# Patient Record
Sex: MALE | Race: WHITE | NOT HISPANIC OR LATINO | Employment: OTHER | URBAN - METROPOLITAN AREA
[De-identification: names, ages, dates, MRNs, and addresses within clinical notes are randomized per-mention and may not be internally consistent; named-entity substitution may affect disease eponyms.]

---

## 2017-04-19 RX ORDER — FLECAINIDE ACETATE 50 MG/1
50 TABLET ORAL 2 TIMES DAILY
Qty: 60 TABLET | Refills: 0 | Status: SHIPPED | OUTPATIENT
Start: 2017-04-19 | End: 2017-04-19 | Stop reason: SDUPTHER

## 2017-04-19 RX ORDER — FLECAINIDE ACETATE 50 MG/1
50 TABLET ORAL 2 TIMES DAILY
Qty: 20 TABLET | Refills: 3 | Status: SHIPPED | OUTPATIENT
Start: 2017-04-19 | End: 2018-12-08

## 2018-02-21 ENCOUNTER — OFFICE VISIT (OUTPATIENT)
Dept: RETAIL CLINIC | Facility: CLINIC | Age: 33
End: 2018-02-21

## 2018-02-21 VITALS
TEMPERATURE: 97.9 F | SYSTOLIC BLOOD PRESSURE: 110 MMHG | HEART RATE: 97 BPM | RESPIRATION RATE: 13 BRPM | OXYGEN SATURATION: 97 % | DIASTOLIC BLOOD PRESSURE: 80 MMHG

## 2018-02-21 DIAGNOSIS — J40 BRONCHITIS: ICD-10-CM

## 2018-02-21 DIAGNOSIS — I20.0 UNSTABLE ANGINA PECTORIS (HCC): Primary | ICD-10-CM

## 2018-02-21 DIAGNOSIS — R68.89 FLU-LIKE SYMPTOMS: ICD-10-CM

## 2018-02-21 PROBLEM — R05.9 COUGH: Status: ACTIVE | Noted: 2018-02-21

## 2018-02-21 PROBLEM — R09.89 CHEST CONGESTION: Status: ACTIVE | Noted: 2018-02-21

## 2018-02-21 LAB
EXPIRATION DATE: NORMAL
FLUAV AG NPH QL: NORMAL
FLUBV AG NPH QL: NORMAL
INTERNAL CONTROL: NORMAL
Lab: NORMAL

## 2018-02-21 PROCEDURE — 87804 INFLUENZA ASSAY W/OPTIC: CPT | Performed by: NURSE PRACTITIONER

## 2018-02-21 PROCEDURE — 99203 OFFICE O/P NEW LOW 30 MIN: CPT | Performed by: NURSE PRACTITIONER

## 2018-02-21 RX ORDER — PREDNISONE 1 MG/1
TABLET ORAL
Qty: 21 TABLET | Refills: 0 | Status: SHIPPED | OUTPATIENT
Start: 2018-02-21 | End: 2018-03-30

## 2018-02-21 RX ORDER — BROMPHENIRAMINE MALEATE, PSEUDOEPHEDRINE HYDROCHLORIDE, AND DEXTROMETHORPHAN HYDROBROMIDE 2; 30; 10 MG/5ML; MG/5ML; MG/5ML
5 SYRUP ORAL 4 TIMES DAILY PRN
Qty: 150 ML | Refills: 0 | Status: SHIPPED | OUTPATIENT
Start: 2018-02-21 | End: 2018-03-30

## 2018-02-21 RX ORDER — ALBUTEROL SULFATE 90 UG/1
2 AEROSOL, METERED RESPIRATORY (INHALATION) EVERY 4 HOURS PRN
Qty: 1 INHALER | Refills: 0 | Status: SHIPPED | OUTPATIENT
Start: 2018-02-21 | End: 2018-03-30

## 2018-02-21 NOTE — PROGRESS NOTES
Sujata Moon is a 32 y.o. male.     HPI Comments: Flu like symptoms , cough, chest congestion and occasional chest pain  during exertion from few days     Cough   This is a new problem. The current episode started yesterday. The cough is non-productive. Associated symptoms include chest pain, headaches, postnasal drip, rhinorrhea and wheezing. Pertinent negatives include no fever. Risk factors for lung disease include smoking/tobacco exposure. He has tried OTC cough suppressant for the symptoms. His past medical history is significant for bronchitis and environmental allergies.   Headache    Associated symptoms include coughing and rhinorrhea. Pertinent negatives include no fever.        The following portions of the patient's history were reviewed and updated as appropriate: allergies, current medications, past family history, past medical history, past social history, past surgical history and problem list.    Review of Systems   Constitutional: Negative for fatigue and fever.   HENT: Positive for congestion, postnasal drip and rhinorrhea.    Eyes: Negative.    Respiratory: Positive for cough, chest tightness and wheezing.    Cardiovascular: Positive for chest pain and leg swelling. Negative for palpitations.        Chest pain during exertion, chest wall pain during exertion  And history of afib   Gastrointestinal: Negative.    Allergic/Immunologic: Positive for environmental allergies.   Neurological: Positive for headaches.       Objective   Physical Exam   Constitutional: He appears well-developed and well-nourished.   HENT:   Head: Normocephalic and atraumatic.   Right Ear: External ear normal.   Left Ear: External ear normal.   Nose: Mucosal edema present. No rhinorrhea. Right sinus exhibits no maxillary sinus tenderness and no frontal sinus tenderness. Left sinus exhibits no maxillary sinus tenderness and no frontal sinus tenderness.   Mouth/Throat: No oropharyngeal exudate, posterior  oropharyngeal edema or posterior oropharyngeal erythema.   Eyes: Pupils are equal, round, and reactive to light.   Neck: Normal range of motion.   Cardiovascular: Normal rate, regular rhythm and normal heart sounds.    Pulmonary/Chest: Effort normal. No respiratory distress. He has wheezes. He has rhonchi in the right middle field and the left middle field. He has no rales. He exhibits tenderness.   Abdominal: Soft.   Musculoskeletal:        Arms:  Chest tenderness during exertion/ breathing   Nursing note and vitals reviewed.      Assessment/Plan   Jayson was seen today for cough, flu symptoms, headache and chest pain.    Diagnoses and all orders for this visit:    Unstable angina pectoris    Flu-like symptoms  -     POCT Influenza A/B    Bronchitis  -     predniSONE (DELTASONE) 5 MG tablet; 5mg pack with package instructions  -     brompheniramine-pseudoephedrine-DM 30-2-10 MG/5ML syrup; Take 5 mL by mouth 4 (Four) Times a Day As Needed for Congestion or Cough.  -     albuterol (PROVENTIL HFA;VENTOLIN HFA) 108 (90 Base) MCG/ACT inhaler; Inhale 2 puffs Every 4 (Four) Hours As Needed for Wheezing or Shortness of Air.    Talked to the patient about the diagnosis and the negative flu test and refer him to urgent care or ER or primary care for further evaluation for the chest pain during exertion  Talked with his primary care office 10 minutes to get an appointment there to rule out chest pain by chest xray and EKG. Finally primary care told him that they will keep an appointment after 2  months

## 2018-02-21 NOTE — PATIENT INSTRUCTIONS
Influenza Tests  Why am I having this test?  You may have an influenza test to help your health care provider determine what type of respiratory infection you have. The test may also be used to help determine a treatment plan and to monitor influenza activity within a community.  There are two types of influenza virus: types A and B. Often, one strain of type A influenza will be the most common type of influenza in a community during flu season. This is typically between the months of October and May. Influenza tests can help determine which strain of influenza type A is occurring most often in the community.  What kind of sample is taken?  Influenza tests are performed by collecting a small sample of fluids (secretions) from your nose or throat using a cotton swab. Tests performed on nasal secretions are more accurate than tests performed on a sample taken from your throat.  · Rapid influenza tests are available and have become the most frequently used tests for influenza. They are most accurate when completed within the first 48 hours after your symptoms begin.  ¨ Depending on the method, a rapid influenza test may be completed in your health care provider's office in less than 30 minutes. It can also be sent to a lab with the results available the same day.  ¨ Depending on the particular type of test used, it can identify influenza type A, a mixture of types A and B, or differentiate between type A and B.  · Another test that your health care provider may order is a viral culture. This also requires the collection of secretions from your nose or throat. The sample is then sent to a lab for processing. This may take several days to complete.  How are the results reported?  Your test results will be reported as either positive or negative. A false-negative result can occur. A false-negative result is incorrect because it indicates a condition or finding is not present when it is.  It is your responsibility to  obtain your test results. Ask the lab or department performing the test when and how you will get your results.  What do the results mean?  · A positive test means you have influenza. Tests may further determine the type of influenza you have.  · A negative influenza test result means it is not likely that you have influenza.  · A false-negative result can occur. False-negative results are more likely to happen at the height of the influenza season.  Talk with your health care provider to discuss your results, treatment options, and if necessary, the need for more tests. Talk with your health care provider if you have any questions about your results.  Talk with your health care provider to discuss your results, treatment options, and if necessary, the need for more tests. Talk with your health care provider if you have any questions about your results.  This information is not intended to replace advice given to you by your health care provider. Make sure you discuss any questions you have with your health care provider.  Document Released: 09/27/2006 Document Revised: 08/23/2017 Document Reviewed: 05/06/2015  [employer] Interactive Patient Education © 2017 [employer]    Acute Bronchitis, Adult  Acute bronchitis is sudden (acute) swelling of the air tubes (bronchi) in the lungs. Acute bronchitis causes these tubes to fill with mucus, which can make it hard to breathe. It can also cause coughing or wheezing.  In adults, acute bronchitis usually goes away within 2 weeks. A cough caused by bronchitis may last up to 3 weeks. Smoking, allergies, and asthma can make the condition worse. Repeated episodes of bronchitis may cause further lung problems, such as chronic obstructive pulmonary disease (COPD).  What are the causes?  This condition can be caused by germs and by substances that irritate the lungs, including:  · Cold and flu viruses. This condition is most often caused by the same virus that causes a  cold.  · Bacteria.  · Exposure to tobacco smoke, dust, fumes, and air pollution.  What increases the risk?  This condition is more likely to develop in people who:  · Have close contact with someone with acute bronchitis.  · Are exposed to lung irritants, such as tobacco smoke, dust, fumes, and vapors.  · Have a weak immune system.  · Have a respiratory condition such as asthma.  What are the signs or symptoms?  Symptoms of this condition include:  · A cough.  · Coughing up clear, yellow, or green mucus.  · Wheezing.  · Chest congestion.  · Shortness of breath.  · A fever.  · Body aches.  · Chills.  · A sore throat.  How is this diagnosed?  This condition is usually diagnosed with a physical exam. During the exam, your health care provider may order tests, such as chest X-rays, to rule out other conditions. He or she may also:  · Test a sample of your mucus for bacterial infection.  · Check the level of oxygen in your blood. This is done to check for pneumonia.  · Do a chest X-ray or lung function testing to rule out pneumonia and other conditions.  · Perform blood tests.  Your health care provider will also ask about your symptoms and medical history.  How is this treated?  Most cases of acute bronchitis clear up over time without treatment. Your health care provider may recommend:  · Drinking more fluids. Drinking more makes your mucus thinner, which may make it easier to breathe.  · Taking a medicine for a fever or cough.  · Taking an antibiotic medicine.  · Using an inhaler to help improve shortness of breath and to control a cough.  · Using a cool mist vaporizer or humidifier to make it easier to breathe.  Follow these instructions at home:  Medicines   · Take over-the-counter and prescription medicines only as told by your health care provider.  · If you were prescribed an antibiotic, take it as told by your health care provider. Do not stop taking the antibiotic even if you start to feel better.  General  instructions   · Get plenty of rest.  · Drink enough fluids to keep your urine clear or pale yellow.  · Avoid smoking and secondhand smoke. Exposure to cigarette smoke or irritating chemicals will make bronchitis worse. If you smoke and you need help quitting, ask your health care provider. Quitting smoking will help your lungs heal faster.  · Use an inhaler, cool mist vaporizer, or humidifier as told by your health care provider.  · Keep all follow-up visits as told by your health care provider. This is important.  How is this prevented?  To lower your risk of getting this condition again:  · Wash your hands often with soap and water. If soap and water are not available, use hand .  · Avoid contact with people who have cold symptoms.  · Try not to touch your hands to your mouth, nose, or eyes.  · Make sure to get the flu shot every year.  Contact a health care provider if:  · Your symptoms do not improve in 2 weeks of treatment.  Get help right away if:  · You cough up blood.  · You have chest pain.  · You have severe shortness of breath.  · You become dehydrated.  · You faint or keep feeling like you are going to faint.  · You keep vomiting.  · You have a severe headache.  · Your fever or chills gets worse.  This information is not intended to replace advice given to you by your health care provider. Make sure you discuss any questions you have with your health care provider.  Document Released: 01/25/2006 Document Revised: 07/12/2017 Document Reviewed: 06/07/2017  [employer] Interactive Patient Education © 2017 [employer]    Angina Pectoris  Angina pectoris, often called angina, is extreme discomfort in the chest, neck, or arm. This is caused by a lack of blood in the middle and thickest layer of the heart wall (myocardium). There are four types of angina:  · Stable angina. Stable angina usually occurs in episodes of predictable frequency and duration. It is usually brought on by physical activity,  stress, or excitement. Stable angina usually lasts a few minutes and can often be relieved by a medicine that you place under your tongue. This medicine is called sublingual nitroglycerin.  · Unstable angina. Unstable angina can occur even when you are doing little or no physical activity. It can even occur while you are sleeping or when you are at rest. It can suddenly increase in severity or frequency. It may not be relieved by sublingual nitroglycerin, and it can last up to 30 minutes.  · Microvascular angina. This type of angina is caused by a disorder of tiny blood vessels called arterioles. Microvascular angina is more common in women. The pain may be more severe and last longer than other types of angina pectoris.  · Prinzmetal or variant angina. This type of angina pectoris is rare and usually occurs when you are doing little or no physical activity. It especially occurs in the early morning hours.  What are the causes?  Atherosclerosis is the cause of angina. This is the buildup of fat and cholesterol (plaque) on the inside of the arteries. Over time, the plaque may narrow or block the artery, and this will lessen blood flow to the heart. Plaque can also become weak and break off within a coronary artery to form a clot and cause a sudden blockage.  What increases the risk?  Risk factors common to both men and women include:  · High cholesterol levels.  · High blood pressure (hypertension).  · Tobacco use.  · Diabetes.  · Family history of angina.  · Obesity.  · Lack of exercise.  · A diet high in saturated fats.  Women are at greater risk for angina if they are:  · Over age 55.  · Postmenopausal.  What are the signs or symptoms?  Many people do not experience any symptoms during the early stages of angina. As the condition progresses, symptoms common to both men and women may include:  · Chest pain.  ¨ The pain can be described as a crushing or squeezing in the chest, or a tightness, pressure, fullness, or  heaviness in the chest.  ¨ The pain can last more than a few minutes, or it can stop and recur.  · Pain in the arms, neck, jaw, or back.  · Unexplained heartburn or indigestion.  · Shortness of breath.  · Nausea.  · Sudden cold sweats.  · Sudden light-headedness.  Many women have chest discomfort and some of the other symptoms. However, women often have different (atypical) symptoms, such as:  · Fatigue.  · Unexplained feelings of nervousness or anxiety.  · Unexplained weakness.  · Dizziness or fainting.  Sometimes, women may have angina without any symptoms.  How is this diagnosed?  Tests to diagnose angina may include:  · ECG (electrocardiogram).  · Exercise stress test. This looks for signs of blockage when the heart is being exercised.  · Pharmacologic stress test. This test looks for signs of blockage when the heart is being stressed with a medicine.  · Blood tests.  · Coronary angiogram. This is a procedure to look at the coronary arteries to see if there is any blockage.  How is this treated?  The treatment of angina may include the following:  · Healthy behavioral changes to reduce or control risk factors.  · Medicine.  · Coronary stenting. A stent helps to keep an artery open.  · Coronary angioplasty. This procedure widens a narrowed or blocked artery.  · Coronary artery bypass surgery. This will allow your blood to pass the blockage (bypass) to reach your heart.  Follow these instructions at home:  · Take medicines only as directed by your health care provider.  · Do not take the following medicines unless your health care provider approves:  ¨ Nonsteroidal anti-inflammatory drugs (NSAIDs), such as ibuprofen, naproxen, or celecoxib.  ¨ Vitamin supplements that contain vitamin A, vitamin E, or both.  ¨ Hormone replacement therapy that contains estrogen with or without progestin.  · Manage other health conditions such as hypertension and diabetes as directed by your health care provider.  · Follow a  heart-healthy diet. A dietitian can help to educate you about healthy food options and changes.  · Use healthy cooking methods such as roasting, grilling, broiling, baking, poaching, steaming, or stir-frying. Talk to a dietitian to learn more about healthy cooking methods.  · Follow an exercise program approved by your health care provider.  · Maintain a healthy weight. Lose weight as approved by your health care provider.  · Plan rest periods when fatigued.  · Learn to manage stress.  · Do not use any tobacco products, including cigarettes, chewing tobacco, or electronic cigarettes. If you need help quitting, ask your health care provider.  · If you drink alcohol, and your health care provider approves, limit your alcohol intake to no more than 1 drink per day. One drink equals 12 ounces of beer, 5 ounces of wine, or 1½ ounces of hard liquor.  · Stop illegal drug use.  · Keep all follow-up visits as directed by your health care provider. This is important.  Get help right away if:  · You have pain in your chest, neck, arm, jaw, stomach, or back that lasts more than a few minutes, is recurring, or is unrelieved by taking sublingual nitroglycerin.  · You have profuse sweating without cause.  · You have unexplained:  ¨ Heartburn or indigestion.  ¨ Shortness of breath or difficulty breathing.  ¨ Nausea or vomiting.  ¨ Fatigue.  ¨ Feelings of nervousness or anxiety.  ¨ Weakness.  ¨ Diarrhea.  · You have sudden light-headedness or dizziness.  · You faint.  These symptoms may represent a serious problem that is an emergency. Do not wait to see if the symptoms will go away. Get medical help right away. Call your local emergency services (911 in the [place] Do not drive yourself to the hospital.  This information is not intended to replace advice given to you by your health care provider. Make sure you discuss any questions you have with your health care provider.  Document Released: 12/18/2006 Document Revised: 05/31/2017  Document Reviewed: 04/21/2015  [employer] Interactive Patient Education © 2017 [employer]  Talked to the patient about the diagnosis and educate the patient and advise to visit to PCP if the symptoms worsens  Nonspecific Chest Pain  Chest pain can be caused by many different conditions. There is a chance that your pain could be related to something serious, such as a heart attack or a blood clot in your lungs. Chest pain can also be caused by conditions that are not life-threatening. If you have chest pain, it is very important to follow up with your doctor.  Follow these instructions at home:  Medicines   · If you were prescribed an antibiotic medicine, take it as told by your doctor. Do not stop taking the antibiotic even if you start to feel better.  · Take over-the-counter and prescription medicines only as told by your doctor.  Lifestyle   · Do not use any products that contain nicotine or tobacco, such as cigarettes and e-cigarettes. If you need help quitting, ask your doctor.  · Do not drink alcohol.  · Make lifestyle changes as told by your doctor. These may include:  ¨ Getting regular exercise. Ask your doctor for some activities that are safe for you.  ¨ Eating a heart-healthy diet. A diet specialist (dietitian) can help you to learn healthy eating options.  ¨ Staying at a healthy weight.  ¨ Managing diabetes, if needed.  ¨ Lowering your stress, as with deep breathing or spending time in nature.  General instructions   · Avoid any activities that make you feel chest pain.  · If your chest pain is because of heartburn:  ¨ Raise (elevate) the head of your bed about 6 inches (15 cm). You can do this by putting blocks under the bed legs at the head of the bed.  ¨ Do not sleep with extra pillows under your head. That does not help heartburn.  · Keep all follow-up visits as told by your doctor. This is important. This includes any further testing if your chest pain does not go away.  Contact a doctor  if:  · Your chest pain does not go away.  · You have a rash with blisters on your chest.  · You have a fever.  · You have chills.  Get help right away if:  · Your chest pain is worse.  · You have a cough that gets worse, or you cough up blood.  · You have very bad (severe) pain in your belly (abdomen).  · You are very weak.  · You pass out (faint).  · You have either of these for no clear reason:  ¨ Sudden chest discomfort.  ¨ Sudden discomfort in your arms, back, neck, or jaw.  · You have shortness of breath at any time.  · You suddenly start to sweat, or your skin gets clammy.  · You feel sick to your stomach (nauseous).  · You throw up (vomit).  · You suddenly feel light-headed or dizzy.  · Your heart starts to beat fast, or it feels like it is skipping beats.  These symptoms may be an emergency. Do not wait to see if the symptoms will go away. Get medical help right away. Call your local emergency services (911 in the [place] Do not drive yourself to the hospital.  This information is not intended to replace advice given to you by your health care provider. Make sure you discuss any questions you have with your health care provider.  Document Released: 06/05/2009 Document Revised: 09/11/2017 Document Reviewed: 09/11/2017  [employer] Interactive Patient Education © 2017 [employer]  Talked to the patient about the diagnosis and the negative flu test and refer him to urgent care or ER or primary care for further evaluation for the chest pain during exertion  Talked with his primary care office 10 minutes to get an appointment there to rule out chest pain by chest xray and EKG. Finally primary care told him that they will keep an appointment after 2  Months. Client agreed here that he will go to ER for the further evaluation of the exertion chest pain

## 2018-03-30 ENCOUNTER — OFFICE VISIT (OUTPATIENT)
Dept: INTERNAL MEDICINE | Facility: CLINIC | Age: 33
End: 2018-03-30

## 2018-03-30 VITALS
BODY MASS INDEX: 22.13 KG/M2 | SYSTOLIC BLOOD PRESSURE: 120 MMHG | OXYGEN SATURATION: 98 % | RESPIRATION RATE: 16 BRPM | HEIGHT: 73 IN | DIASTOLIC BLOOD PRESSURE: 80 MMHG | HEART RATE: 72 BPM | TEMPERATURE: 98.2 F | WEIGHT: 167 LBS

## 2018-03-30 DIAGNOSIS — K20.0 EOSINOPHILIC ESOPHAGITIS: ICD-10-CM

## 2018-03-30 DIAGNOSIS — Z13.29 SCREENING FOR HYPOTHYROIDISM: ICD-10-CM

## 2018-03-30 DIAGNOSIS — I47.9 PAROXYSMAL TACHYCARDIA (HCC): Primary | ICD-10-CM

## 2018-03-30 DIAGNOSIS — R13.19 ESOPHAGEAL DYSPHAGIA: ICD-10-CM

## 2018-03-30 DIAGNOSIS — Z13.1 SCREENING FOR DIABETES MELLITUS: ICD-10-CM

## 2018-03-30 DIAGNOSIS — Z13.220 SCREENING FOR HYPERLIPIDEMIA: ICD-10-CM

## 2018-03-30 PROBLEM — R68.89 FLU-LIKE SYMPTOMS: Status: RESOLVED | Noted: 2018-02-21 | Resolved: 2018-03-30

## 2018-03-30 PROBLEM — I48.91 ATRIAL FIBRILLATION (HCC): Status: ACTIVE | Noted: 2018-03-30

## 2018-03-30 PROBLEM — R05.9 COUGH: Status: RESOLVED | Noted: 2018-02-21 | Resolved: 2018-03-30

## 2018-03-30 PROBLEM — R09.89 CHEST CONGESTION: Status: RESOLVED | Noted: 2018-02-21 | Resolved: 2018-03-30

## 2018-03-30 PROCEDURE — 99203 OFFICE O/P NEW LOW 30 MIN: CPT | Performed by: INTERNAL MEDICINE

## 2018-03-30 RX ORDER — OMEPRAZOLE 40 MG/1
40 CAPSULE, DELAYED RELEASE ORAL DAILY
Qty: 30 CAPSULE | Refills: 2 | Status: SHIPPED | OUTPATIENT
Start: 2018-03-30 | End: 2021-07-22

## 2018-03-30 NOTE — PROGRESS NOTES
Jayson Moon is a 32 y.o. male, who presents with a chief complaint of   Chief Complaint   Patient presents with   • Establish Care   • Atrial Fibrillation       33 yo M here to establish care. All of his problems are new to me.     He was diagnosed with Afib when he was 17-18 years old. He sees Dr. Huizar every 2 years. He takes Flecainade when he feels it coming on. Caffeine and stress brings it on. He doesn't take a baby ASA daily. He does get SOB and has palpiatations when it occurs.     He was seen at the [facility] [place] across the street for PNA during the last week of 2/2017 and was on antibiotics and steroids.     Dr. Kulkarni has followed him for dysphagia and had biopsy 11/2015 that eosinophilic esophagitis. Never treated for this as he was lost to follow up. He has dysphagia and reflux daily. Dysphagia daily to breads and meats. One time, he felt that he couldn't get it down and routinely has to drink water. Spicy foods or cheese makes him have diarrhea.          The following portions of the patient's history were reviewed and updated as appropriate: allergies, current medications, past family history, past medical history, past social history, past surgical history and problem list.    Allergies: Review of patient's allergies indicates no known allergies.    Current Outpatient Prescriptions:   •  flecainide (TAMBOCOR) 50 MG tablet, Take 1 tablet by mouth 2 (Two) Times a Day., Disp: 20 tablet, Rfl: 3  •  omeprazole (PRILOSEC) 40 MG capsule, Take 1 capsule by mouth Daily., Disp: 30 capsule, Rfl: 2  Medications Discontinued During This Encounter   Medication Reason   • brompheniramine-pseudoephedrine-DM 30-2-10 MG/5ML syrup *Therapy completed   • albuterol (PROVENTIL HFA;VENTOLIN HFA) 108 (90 Base) MCG/ACT inhaler *Therapy completed   • predniSONE (DELTASONE) 5 MG tablet *Therapy completed       Review of Systems   Constitutional: Negative for chills, fatigue and fever.   HENT: Negative for congestion.   "  Respiratory: Negative for cough and shortness of breath.    Cardiovascular: Negative for chest pain and palpitations.   Gastrointestinal: Positive for diarrhea (when eating spicy food ). Negative for constipation, nausea and rectal pain.        Dysphagia with bread and meat    Musculoskeletal: Negative for arthralgias and joint swelling.   Skin: Negative for rash.   Neurological: Negative for facial asymmetry and headaches.   Hematological: Does not bruise/bleed easily.             /80 (BP Location: Left arm, Patient Position: Sitting, Cuff Size: Adult)   Pulse 72   Temp 98.2 °F (36.8 °C) (Oral)   Resp 16   Ht 185.4 cm (73\")   Wt 75.8 kg (167 lb)   SpO2 98%   BMI 22.03 kg/m²       Physical Exam   Constitutional: He is oriented to person, place, and time. He appears well-developed and well-nourished. No distress.   HENT:   Head: Normocephalic and atraumatic.   Right Ear: Hearing, tympanic membrane and external ear normal.   Left Ear: Hearing, tympanic membrane and external ear normal.   Nose: Nose normal.   Mouth/Throat: Uvula is midline, oropharynx is clear and moist and mucous membranes are normal. No oropharyngeal exudate. Tonsils are 0 on the right. Tonsils are 0 on the left.   Eyes: Conjunctivae are normal. Right eye exhibits no discharge. Left eye exhibits no discharge. No scleral icterus.   Neck: Neck supple.   Cardiovascular: Normal rate, regular rhythm and normal heart sounds.  Exam reveals no gallop and no friction rub.    No murmur heard.  Pulmonary/Chest: Effort normal and breath sounds normal. No respiratory distress. He has no wheezes. He has no rales.   Abdominal: Soft. Bowel sounds are normal. He exhibits no distension and no mass. There is no tenderness. There is no guarding.   Lymphadenopathy:     He has no cervical adenopathy.   Neurological: He is alert and oriented to person, place, and time.   Skin: Skin is warm. No rash noted.   Psychiatric: He has a normal mood and affect. His " behavior is normal.   Nursing note and vitals reviewed.        Results for orders placed or performed in visit on 02/21/18   POCT Influenza A/B   Result Value Ref Range    Rapid Influenza A Ag neg     Rapid Influenza B Ag neg     Internal Control Passed Passed    Lot Number [id number]     Expiration Date 11/2,020            Jayson was seen today for establish care and atrial fibrillation.    Diagnoses and all orders for this visit:    Paroxysmal tachycardia  -     CBC & Differential  -     Comprehensive Metabolic Panel  -     Lipid Panel With LDL / HDL Ratio  -     TSH Rfx On Abnormal To Free T4    Eosinophilic esophagitis  -     Ambulatory Referral to Gastroenterology    Esophageal dysphagia  -     Ambulatory Referral to Gastroenterology    Screening for diabetes mellitus  -     Comprehensive Metabolic Panel  -     Urinalysis With / Culture If Indicated - Urine, Clean Catch    Screening for hyperlipidemia  -     Lipid Panel With LDL / HDL Ratio    Screening for hypothyroidism  -     TSH Rfx On Abnormal To Free T4    Other orders  -     omeprazole (PRILOSEC) 40 MG capsule; Take 1 capsule by mouth Daily.        His Afib is under good control. Uses Flecainide as needed when he has symptoms. Will check EKG when he returns as it has been about 2 years since he had his last one.     He was lost to follow up regarding his esophageus. I am going to start PPI for 2 months and will send back to Dr. Kulkarni to follow up and see if steroids are needed. Repeat EGD if she feels that this is warranted.     Will check screening labs as well given his heart history and lupus in mother.         Return in about 8 weeks (around 5/23/2018) for Annual physical, Recheck.    Ahsley Bueno MD  03/30/2018

## 2018-05-03 ENCOUNTER — OFFICE VISIT (OUTPATIENT)
Dept: GASTROENTEROLOGY | Facility: CLINIC | Age: 33
End: 2018-05-03

## 2018-05-03 VITALS
DIASTOLIC BLOOD PRESSURE: 76 MMHG | BODY MASS INDEX: 21.92 KG/M2 | SYSTOLIC BLOOD PRESSURE: 118 MMHG | WEIGHT: 165.4 LBS | HEIGHT: 73 IN

## 2018-05-03 DIAGNOSIS — R13.10 DYSPHAGIA, UNSPECIFIED TYPE: Primary | ICD-10-CM

## 2018-05-03 DIAGNOSIS — K20.0 ESOPHAGITIS, EOSINOPHILIC: ICD-10-CM

## 2018-05-03 PROCEDURE — 99214 OFFICE O/P EST MOD 30 MIN: CPT | Performed by: INTERNAL MEDICINE
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
## 2018-05-07 ENCOUNTER — TELEPHONE (OUTPATIENT)
Dept: GASTROENTEROLOGY | Facility: CLINIC | Age: 33
End: 2018-05-07

## 2018-05-07 NOTE — TELEPHONE ENCOUNTER
Patient is having severe sinus congestion. Will have to cancel EGD scheduled for tomorrow.  He wanted to know if you were going to prescribe any medication for him? He would like to avoid the EGD if possible or would like to have it done at [facility] due to the cost. Is it okay to wail until next month if he still needs it.?

## 2018-05-10 RX ORDER — FLUTICASONE PROPIONATE 220 UG/1
2 AEROSOL, METERED RESPIRATORY (INHALATION)
Qty: 1 INHALER | Refills: 2 | Status: SHIPPED | OUTPATIENT
Start: 2018-05-10 | End: 2018-05-25

## 2018-05-25 ENCOUNTER — OFFICE VISIT (OUTPATIENT)
Dept: INTERNAL MEDICINE | Facility: CLINIC | Age: 33
End: 2018-05-25

## 2018-05-25 VITALS
OXYGEN SATURATION: 98 % | DIASTOLIC BLOOD PRESSURE: 78 MMHG | HEART RATE: 97 BPM | SYSTOLIC BLOOD PRESSURE: 118 MMHG | TEMPERATURE: 97.9 F | WEIGHT: 161.2 LBS | RESPIRATION RATE: 18 BRPM | BODY MASS INDEX: 21.36 KG/M2 | HEIGHT: 73 IN

## 2018-05-25 DIAGNOSIS — Z00.00 ENCOUNTER FOR ANNUAL PHYSICAL EXAM: Primary | ICD-10-CM

## 2018-05-25 LAB
ALBUMIN SERPL-MCNC: 4.9 G/DL (ref 3.5–5.2)
ALBUMIN/GLOB SERPL: 2.3 G/DL
ALP SERPL-CCNC: 62 U/L (ref 40–129)
ALT SERPL-CCNC: 11 U/L (ref 5–41)
AST SERPL-CCNC: 15 U/L (ref 5–40)
BASOPHILS # BLD AUTO: 0.05 10*3/MM3 (ref 0–0.2)
BASOPHILS NFR BLD AUTO: 0.8 % (ref 0–2)
BILIRUB SERPL-MCNC: 0.9 MG/DL (ref 0.2–1.2)
BUN SERPL-MCNC: 12 MG/DL (ref 6–20)
BUN/CREAT SERPL: 14.6 (ref 7–25)
CALCIUM SERPL-MCNC: 10.3 MG/DL (ref 8.6–10.5)
CHLORIDE SERPL-SCNC: 104 MMOL/L (ref 98–107)
CHOLEST SERPL-MCNC: 138 MG/DL (ref 0–200)
CO2 SERPL-SCNC: 30.8 MMOL/L (ref 22–29)
CREAT SERPL-MCNC: 0.82 MG/DL (ref 0.76–1.27)
EOSINOPHIL # BLD AUTO: 0.17 10*3/MM3 (ref 0.1–0.3)
EOSINOPHIL NFR BLD AUTO: 2.8 % (ref 0–4)
ERYTHROCYTE [DISTWIDTH] IN BLOOD BY AUTOMATED COUNT: 12.1 % (ref 11.5–14.5)
GFR SERPLBLD CREATININE-BSD FMLA CKD-EPI: 110 ML/MIN/1.73
GFR SERPLBLD CREATININE-BSD FMLA CKD-EPI: 133 ML/MIN/1.73
GLOBULIN SER CALC-MCNC: 2.1 GM/DL
GLUCOSE SERPL-MCNC: 97 MG/DL (ref 65–99)
HCT VFR BLD AUTO: 43.4 % (ref 42–52)
HDLC SERPL-MCNC: 62 MG/DL (ref 40–60)
HGB BLD-MCNC: 15.4 G/DL (ref 14–18)
IMM GRANULOCYTES # BLD: 0.01 10*3/MM3 (ref 0–0.03)
IMM GRANULOCYTES NFR BLD: 0.2 % (ref 0–0.5)
LDLC SERPL CALC-MCNC: 65 MG/DL (ref 0–100)
LDLC/HDLC SERPL: 1.05 {RATIO}
LYMPHOCYTES # BLD AUTO: 2.15 10*3/MM3 (ref 0.6–4.8)
LYMPHOCYTES NFR BLD AUTO: 35.7 % (ref 20–45)
MCH RBC QN AUTO: 32 PG (ref 27–31)
MCHC RBC AUTO-ENTMCNC: 35.5 G/DL (ref 31–37)
MCV RBC AUTO: 90.2 FL (ref 80–94)
MONOCYTES # BLD AUTO: 0.49 10*3/MM3 (ref 0–1)
MONOCYTES NFR BLD AUTO: 8.1 % (ref 3–8)
NEUTROPHILS # BLD AUTO: 3.16 10*3/MM3 (ref 1.5–8.3)
NEUTROPHILS NFR BLD AUTO: 52.4 % (ref 45–70)
NRBC BLD AUTO-RTO: 0 /100 WBC (ref 0–0)
PLATELET # BLD AUTO: 231 10*3/MM3 (ref 140–500)
POTASSIUM SERPL-SCNC: 4.4 MMOL/L (ref 3.5–5.2)
PROT SERPL-MCNC: 7 G/DL (ref 6–8.5)
RBC # BLD AUTO: 4.81 10*6/MM3 (ref 4.7–6.1)
SODIUM SERPL-SCNC: 146 MMOL/L (ref 136–145)
TRIGL SERPL-MCNC: 55 MG/DL (ref 0–150)
TSH SERPL DL<=0.005 MIU/L-ACNC: 2.03 MIU/ML (ref 0.27–4.2)
UNABLE TO VOID: NORMAL
VLDLC SERPL CALC-MCNC: 11 MG/DL (ref 8–32)
WBC # BLD AUTO: 6.03 10*3/MM3 (ref 4.8–10.8)

## 2018-05-25 PROCEDURE — 99395 PREV VISIT EST AGE 18-39: CPT | Performed by: INTERNAL MEDICINE

## 2018-05-25 NOTE — PROGRESS NOTES
Patient Name: Jayson Moon    SUBJECTIVE    Jayson is a 33 y.o. male presenting for Annual Exam (CPE, lab results )    32 yo M here for physical and is doing well. He states that he has been on medication for 3.5 weeks and dysphagia resolved. He still has chicken that sometimes get stuck. Steak and bread and potatoes are not getting stuck. No abdominal pain.     He doesn't always eat breakfast due to time constraints. He eats drive through for lunch and eats burgers, fries and coke. For dinner, they eat out three times per week. Lacking in fruits and vegetables.     He exercises once per week. He plays  at his [employer] for a couple of hours.     Well Adult Physical   Patient here for a comprehensive physical exam.The patient reports problems - dysphagia    Do you take any herbs or supplements that were not prescribed by a doctor? no   Are you taking calcium supplements? no   Are you taking aspirin daily? no    Jayson Moon 33 y.o. male who presents for an Annual Wellness Visit.  he has a history of   Patient Active Problem List   Diagnosis   • Paroxysmal tachycardia   • Eosinophilic esophagitis   • Esophageal dysphagia       Health Habits:  Dental Exam. up to date  Eye Exam. up to date  Exercise: 1 times/week.  Current exercise activities includbasketball once per week basketball once per week       The following portions of the patient's history were reviewed and updated as appropriate: allergies, current medications, past family history, past medical history, past social history, past surgical history and problem list.    Review of Systems   Constitutional: Negative for chills, fatigue and fever.   HENT: Negative for congestion.    Respiratory: Negative for cough and shortness of breath.    Cardiovascular: Negative for chest pain and palpitations.   Gastrointestinal: Negative for abdominal pain, diarrhea, nausea and vomiting.   Genitourinary: Negative for difficulty urinating and dysuria.   Musculoskeletal:  "Negative for arthralgias.   Skin: Negative for pallor.   Neurological: Negative for dizziness and headaches.       Patient has no known allergies.      Current Outpatient Prescriptions:   •  flecainide (TAMBOCOR) 50 MG tablet, Take 1 tablet by mouth 2 (Two) Times a Day. (Patient taking differently: Take 50 mg by mouth As Needed.), Disp: 20 tablet, Rfl: 3  •  omeprazole (PRILOSEC) 40 MG capsule, Take 1 capsule by mouth Daily., Disp: 30 capsule, Rfl: 2    OBJECTIVE    /78 (BP Location: Left arm, Patient Position: Sitting, Cuff Size: Adult)   Pulse 97   Temp 97.9 °F (36.6 °C) (Oral)   Resp 18   Ht 185.4 cm (72.99\")   Wt 73.1 kg (161 lb 3.2 oz)   SpO2 98%   BMI 21.27 kg/m²      Physical Exam   Constitutional: He is oriented to person, place, and time. He appears well-developed and well-nourished. No distress.   HENT:   Head: Normocephalic and atraumatic.   Right Ear: Hearing, tympanic membrane and external ear normal.   Left Ear: Hearing, tympanic membrane and external ear normal.   Nose: Nose normal.   Mouth/Throat: Uvula is midline and mucous membranes are normal. Posterior oropharyngeal erythema present. No oropharyngeal exudate or posterior oropharyngeal edema. Tonsils are 0 on the right. Tonsils are 0 on the left. No tonsillar exudate.   Eyes: Conjunctivae and EOM are normal. Right eye exhibits no discharge. Left eye exhibits no discharge. No scleral icterus.   Neck: Trachea normal. Neck supple. Carotid bruit is not present. No thyroid mass and no thyromegaly present.   Cardiovascular: Normal rate, regular rhythm and normal heart sounds.  Exam reveals no gallop and no friction rub.    No murmur heard.  Pulmonary/Chest: Effort normal and breath sounds normal. No respiratory distress. He has no wheezes. He has no rales.   Abdominal: Soft. Bowel sounds are normal. He exhibits no distension and no mass. There is no tenderness. There is no guarding.   Musculoskeletal: Normal range of motion. "   Lymphadenopathy:     He has no cervical adenopathy.   Neurological: He is alert and oriented to person, place, and time. No sensory deficit. He exhibits normal muscle tone. Coordination normal.   Reflex Scores:       Patellar reflexes are 2+ on the right side and 2+ on the left side.  Skin: Skin is warm. No rash noted.   Psychiatric: He has a normal mood and affect. His behavior is normal.   Nursing note and vitals reviewed.        ASSESSMENT AND PLAN  Update vaccines if indicated. I want Jayson to begin a progressive daily aerobic exercise program, follow a low fat, low cholesterol diet, reduce exposure to stress, improve dietary compliance and continue current medications.     Needs to follow up with Dr. Kulkarni for his EGD which he rescheduled    He has regular follow up with Dr. Huizar. EKG up to date and in chart. Currently no symptoms of palpitations or CP.     Jayson was seen today for annual exam.    Diagnoses and all orders for this visit:    Encounter for annual physical exam         Return in about 2 years (around 5/25/2020) for Annual physical.

## 2018-05-29 ENCOUNTER — TELEPHONE (OUTPATIENT)
Dept: INTERNAL MEDICINE | Facility: CLINIC | Age: 33
End: 2018-05-29

## 2018-05-29 NOTE — TELEPHONE ENCOUNTER
Patient has been advised and voiced understanding.     ----- Message from Ashley Bueno MD sent at 5/29/2018  8:14 AM EDT -----  Labs looks good with no concerns. Cholesterol within normal limits. I want him to still focus on better diet and exercise that we discussed in clinic even though everything is okay though.

## 2018-12-08 ENCOUNTER — OFFICE VISIT (OUTPATIENT)
Dept: RETAIL CLINIC | Facility: CLINIC | Age: 33
End: 2018-12-08

## 2018-12-08 VITALS
HEART RATE: 75 BPM | OXYGEN SATURATION: 98 % | RESPIRATION RATE: 18 BRPM | TEMPERATURE: 98.3 F | DIASTOLIC BLOOD PRESSURE: 76 MMHG | SYSTOLIC BLOOD PRESSURE: 122 MMHG

## 2018-12-08 DIAGNOSIS — Z76.0 REPEAT PRESCRIPTION ISSUE: ICD-10-CM

## 2018-12-08 DIAGNOSIS — J40 BRONCHITIS: Primary | ICD-10-CM

## 2018-12-08 DIAGNOSIS — J01.00 ACUTE NON-RECURRENT MAXILLARY SINUSITIS: ICD-10-CM

## 2018-12-08 PROCEDURE — 99213 OFFICE O/P EST LOW 20 MIN: CPT | Performed by: NURSE PRACTITIONER

## 2018-12-08 RX ORDER — AMOXICILLIN 875 MG/1
875 TABLET, COATED ORAL 2 TIMES DAILY
Qty: 14 TABLET | Refills: 0 | Status: SHIPPED | OUTPATIENT
Start: 2018-12-08 | End: 2018-12-15

## 2018-12-08 RX ORDER — FLECAINIDE ACETATE 50 MG/1
50 TABLET ORAL 2 TIMES DAILY PRN
Qty: 60 TABLET | Refills: 0 | Status: SHIPPED | OUTPATIENT
Start: 2018-12-08 | End: 2019-01-07

## 2018-12-08 RX ORDER — PREDNISONE 20 MG/1
20 TABLET ORAL 2 TIMES DAILY
Qty: 10 TABLET | Refills: 0 | Status: SHIPPED | OUTPATIENT
Start: 2018-12-08 | End: 2018-12-13

## 2018-12-08 NOTE — PROGRESS NOTES
Sujata Moon is a 33 y.o. male.     Cough   This is a new problem. Episode onset: 2 weeks ago. The problem has been unchanged. The problem occurs every few minutes. The cough is productive of sputum. Associated symptoms include headaches, nasal congestion, postnasal drip, rhinorrhea, sweats and wheezing. Pertinent negatives include no chest pain, chills, ear congestion, ear pain, eye redness, fever, heartburn, hemoptysis, myalgias, sore throat or shortness of breath. The symptoms are aggravated by lying down. Treatments tried: mucinex. The treatment provided no relief. His past medical history is significant for environmental allergies. There is no history of asthma, bronchitis or pneumonia.       The following portions of the patient's history were reviewed and updated as appropriate: allergies, current medications, past medical history, past social history, past surgical history and problem list.    Review of Systems   Constitutional: Positive for diaphoresis and fatigue. Negative for appetite change, chills and fever.   HENT: Positive for congestion, postnasal drip, rhinorrhea and sinus pressure. Negative for ear discharge, ear pain, nosebleeds, sneezing, sore throat and voice change.    Eyes: Negative for redness and itching.   Respiratory: Positive for cough, chest tightness and wheezing. Negative for hemoptysis, shortness of breath and stridor.    Cardiovascular: Negative for chest pain, palpitations (hx a-fib, has  prescription of flecinide on hand and in past when feels palpitations he takes it and sx resolve within an hour or so) and leg swelling.   Gastrointestinal: Negative for diarrhea, heartburn, nausea and vomiting.   Genitourinary: Negative for decreased urine volume.   Musculoskeletal: Negative for myalgias.   Skin: Negative for color change.   Allergic/Immunologic: Positive for environmental allergies. Negative for immunocompromised state.   Neurological: Positive for headaches.  Negative for dizziness and syncope.       Objective   Physical Exam   Constitutional: He is oriented to person, place, and time. He appears well-developed and well-nourished. He is cooperative.  Non-toxic appearance. He does not appear ill. No distress.   HENT:   Right Ear: Hearing, tympanic membrane, external ear and ear canal normal.   Left Ear: Hearing, tympanic membrane, external ear and ear canal normal.   Nose: Mucosal edema and rhinorrhea present. Right sinus exhibits maxillary sinus tenderness. Right sinus exhibits no frontal sinus tenderness. Left sinus exhibits maxillary sinus tenderness. Left sinus exhibits no frontal sinus tenderness.   Mouth/Throat: Uvula is midline and mucous membranes are normal. No oral lesions. No uvula swelling. Oropharyngeal exudate and posterior oropharyngeal erythema present. No posterior oropharyngeal edema. Tonsils are 2+ on the right. Tonsils are 2+ on the left. No tonsillar exudate.   Eyes: Conjunctivae and lids are normal.   Cardiovascular: Normal rate, regular rhythm, S1 normal and S2 normal.   Pulmonary/Chest: Effort normal. He has no decreased breath sounds. He has wheezes in the right upper field, the right middle field, the right lower field and the left upper field. He has no rhonchi. He has no rales.   Lymphadenopathy:     He has no cervical adenopathy.   Neurological: He is alert and oriented to person, place, and time.   Skin: Skin is warm and dry. He is not diaphoretic.   Vitals reviewed.      Assessment/Plan   Jayson was seen today for sinusitis.    Diagnoses and all orders for this visit:    Bronchitis  -     predniSONE (DELTASONE) 20 MG tablet; Take 1 tablet by mouth 2 (Two) Times a Day for 5 days.    Acute non-recurrent maxillary sinusitis  -     amoxicillin (AMOXIL) 875 MG tablet; Take 1 tablet by mouth 2 (Two) Times a Day for 7 days.    Repeat prescription issue  -     flecainide (TAMBOCOR) 50 MG tablet; Take 1 tablet by mouth 2 (Two) Times a Day As Needed  (palpitations) for up to 30 days.          -     Pt reports he has taken corticosteroids multiple times in past and has not experienced any side effects with use, instructed him to stop prednisone if he feels palpitations/SOA/tachycardia and use flecainide         -     May continue with mucinex for congestion relief-increase H2O intake with use        -     Follow up with PCP for persistent symptoms or UC/ER for worsening symptoms

## 2020-04-22 RX ORDER — FLECAINIDE ACETATE 50 MG/1
50 TABLET ORAL AS NEEDED
Qty: 20 TABLET | Refills: 0 | Status: SHIPPED | OUTPATIENT
Start: 2020-04-22 | End: 2020-04-27 | Stop reason: SDUPTHER

## 2020-04-27 RX ORDER — FLECAINIDE ACETATE 50 MG/1
TABLET ORAL
Qty: 30 TABLET | Refills: 0 | Status: SHIPPED | OUTPATIENT
Start: 2020-04-27 | End: 2020-10-13 | Stop reason: SDUPTHER

## 2020-10-13 RX ORDER — FLECAINIDE ACETATE 50 MG/1
TABLET ORAL
Qty: 30 TABLET | Refills: 1 | Status: SHIPPED | OUTPATIENT
Start: 2020-10-13 | End: 2021-01-08

## 2021-01-08 ENCOUNTER — OFFICE VISIT (OUTPATIENT)
Dept: CARDIOLOGY | Facility: CLINIC | Age: 36
End: 2021-01-08

## 2021-01-08 VITALS
DIASTOLIC BLOOD PRESSURE: 88 MMHG | WEIGHT: 177 LBS | HEIGHT: 74 IN | SYSTOLIC BLOOD PRESSURE: 120 MMHG | HEART RATE: 74 BPM | BODY MASS INDEX: 22.72 KG/M2

## 2021-01-08 DIAGNOSIS — I48.0 PAROXYSMAL ATRIAL FIBRILLATION (HCC): Primary | ICD-10-CM

## 2021-01-08 PROCEDURE — 99204 OFFICE O/P NEW MOD 45 MIN: CPT | Performed by: INTERNAL MEDICINE

## 2021-01-08 PROCEDURE — 93000 ELECTROCARDIOGRAM COMPLETE: CPT | Performed by: INTERNAL MEDICINE

## 2021-01-08 RX ORDER — FLECAINIDE ACETATE 50 MG/1
TABLET ORAL
Qty: 30 TABLET | Refills: 1 | Status: SHIPPED | OUTPATIENT
Start: 2021-01-08 | End: 2022-03-11 | Stop reason: SDUPTHER

## 2021-01-08 NOTE — PROGRESS NOTES
Date of Office Visit: 2021  Encounter Provider: Dov Huizar MD  Place of Service: [facility] CARDIOLOGY  Patient Name: Jayson Moon  : 1985    Subjective:     Encounter Date:2021      Patient ID: Jayson Moon is a 35 y.o. male who has a cc of  PAF who every 4 months has a short period of racing irreg heart beat for which he takes 200 mg of flecainide. Episodes last 12 hours. The stimulus for is usually stress, caffeine or lack of sleep. Requires all three.     He had AF documented at age 19.    Otherwise ....   No anginal chest pain,   No sig monroy,   No soa,   No fainting,  No orthostasis.   No edema.   Exercise tolerance: he does no formal exercise now but has normal exercise tolerance.     There have been no hospital admission since the last visit.     There have been no bleeding events.       Past Medical History:   Diagnosis Date   • Atrial fibrillation (CMS/[facility])        Social History     Socioeconomic History   • Marital status:      Spouse name: Not on file   • Number of children: Not on file   • Years of education: Not on file   • Highest education level: Not on file   Tobacco Use   • Smoking status: Never Smoker   • Smokeless tobacco: Never Used   Substance and Sexual Activity   • Alcohol use: No   • Drug use: No   • Sexual activity: Defer   Social History Narrative    Kathy Moon's         Family History   Problem Relation Age of Onset   • Lupus Mother    • Hypertension Mother    • Coronary artery disease Mother    • Atrial fibrillation Father    • Atrial fibrillation Brother    • Heart disease Brother         Possible flutter   • Colon cancer Neg Hx    • Colon polyps Neg Hx        Review of Systems   Constitution: Negative for fever and night sweats.   HENT: Negative for ear pain and stridor.    Eyes: Negative for discharge and visual halos.   Cardiovascular: Negative for cyanosis.   Respiratory: Negative for hemoptysis and sputum  "production.    Hematologic/Lymphatic: Negative for adenopathy.   Skin: Negative for nail changes and unusual hair distribution.   Musculoskeletal: Negative for gout and joint swelling.   Gastrointestinal: Negative for bowel incontinence and flatus.   Genitourinary: Negative for dysuria and flank pain.   Neurological: Negative for seizures and tremors.   Psychiatric/Behavioral: Negative for altered mental status. The patient is not nervous/anxious.             Objective:     Vitals:    01/08/21 1011   BP: 120/88   Pulse: 74   Weight: 80.3 kg (177 lb)   Height: 188 cm (74\")         Eyes:      General:         Right eye: No discharge.         Left eye: No discharge.   HENT:      Head: Normocephalic and atraumatic.   Neck:      Thyroid: No thyromegaly.      Vascular: No JVD.   Pulmonary:      Effort: Pulmonary effort is normal.      Breath sounds: Normal breath sounds. No rales.   Cardiovascular:      Normal rate. Regular rhythm.      No gallop.   Edema:     Peripheral edema absent.   Abdominal:      General: Bowel sounds are normal.      Palpations: Abdomen is soft.      Tenderness: There is no abdominal tenderness.   Musculoskeletal: Normal range of motion.         General: No deformity.   Skin:     General: Skin is warm and dry.      Findings: No erythema.   Neurological:      Mental Status: Alert and oriented to person, place, and time.      Motor: Normal muscle tone.   Psychiatric:         Behavior: Behavior normal.         Thought Content: Thought content normal.           ECG 12 Lead    Date/Time: 1/8/2021 10:28 AM  Performed by: Dov Huizar MD  Authorized by: Dov Huizar MD   Comparison: compared with previous ECG   Similar to previous ECG  Rhythm: sinus rhythm  Rate: normal  Conduction: conduction normal  ST Segments: ST segments normal  T Waves: T waves normal  QRS axis: normal    Clinical impression: normal ECG            Lab Review:       Assessment:          Diagnosis Plan   1. Paroxysmal atrial " fibrillation (CMS/HCC)            Plan:     He has PAF but they have triggers and he can stop them with flecainide prn which is fine.     He is lean and healthy and has no structural heart disease.

## 2022-02-26 ENCOUNTER — HOSPITAL ENCOUNTER (EMERGENCY)
Facility: HOSPITAL | Age: 37
Discharge: HOME | End: 2022-02-26
Attending: EMERGENCY MEDICINE | Admitting: EMERGENCY MEDICINE

## 2022-02-26 ENCOUNTER — APPOINTMENT (OUTPATIENT)
Dept: GENERAL RADIOLOGY | Facility: HOSPITAL | Age: 37
End: 2022-02-26

## 2022-02-26 VITALS
OXYGEN SATURATION: 98 % | BODY MASS INDEX: 23.19 KG/M2 | SYSTOLIC BLOOD PRESSURE: 124 MMHG | HEART RATE: 70 BPM | HEIGHT: 73 IN | WEIGHT: 175 LBS | DIASTOLIC BLOOD PRESSURE: 94 MMHG | TEMPERATURE: 97.8 F | RESPIRATION RATE: 18 BRPM

## 2022-02-26 DIAGNOSIS — R07.9 CHEST PAIN, UNSPECIFIED TYPE: Primary | ICD-10-CM

## 2022-02-26 LAB
ALBUMIN SERPL-MCNC: 4.4 G/DL (ref 3.5–5.2)
ALBUMIN/GLOB SERPL: 2 G/DL
ALP SERPL-CCNC: 77 U/L (ref 39–117)
ALT SERPL W P-5'-P-CCNC: 29 U/L (ref 1–41)
ANION GAP SERPL CALCULATED.3IONS-SCNC: 9.1 MMOL/L (ref 5–15)
AST SERPL-CCNC: 20 U/L (ref 1–40)
BASOPHILS # BLD AUTO: 0.06 10*3/MM3 (ref 0–0.2)
BASOPHILS NFR BLD AUTO: 0.7 % (ref 0–1.5)
BILIRUB SERPL-MCNC: 0.4 MG/DL (ref 0–1.2)
BUN SERPL-MCNC: 11 MG/DL (ref 6–20)
BUN/CREAT SERPL: 14.1 (ref 7–25)
CALCIUM SPEC-SCNC: 9 MG/DL (ref 8.6–10.5)
CHLORIDE SERPL-SCNC: 102 MMOL/L (ref 98–107)
CO2 SERPL-SCNC: 25.9 MMOL/L (ref 22–29)
CREAT SERPL-MCNC: 0.78 MG/DL (ref 0.76–1.27)
DEPRECATED RDW RBC AUTO: 42.3 FL (ref 37–54)
EOSINOPHIL # BLD AUTO: 0.41 10*3/MM3 (ref 0–0.4)
EOSINOPHIL NFR BLD AUTO: 5 % (ref 0.3–6.2)
ERYTHROCYTE [DISTWIDTH] IN BLOOD BY AUTOMATED COUNT: 12.3 % (ref 12.3–15.4)
GFR SERPL CREATININE-BSD FRML MDRD: 114 ML/MIN/1.73
GLOBULIN UR ELPH-MCNC: 2.2 GM/DL
GLUCOSE SERPL-MCNC: 104 MG/DL (ref 65–99)
HCT VFR BLD AUTO: 42 % (ref 37.5–51)
HGB BLD-MCNC: 14.6 G/DL (ref 13–17.7)
IMM GRANULOCYTES # BLD AUTO: 0.01 10*3/MM3 (ref 0–0.05)
IMM GRANULOCYTES NFR BLD AUTO: 0.1 % (ref 0–0.5)
LYMPHOCYTES # BLD AUTO: 2.66 10*3/MM3 (ref 0.7–3.1)
LYMPHOCYTES NFR BLD AUTO: 32.4 % (ref 19.6–45.3)
MCH RBC QN AUTO: 32.4 PG (ref 26.6–33)
MCHC RBC AUTO-ENTMCNC: 34.8 G/DL (ref 31.5–35.7)
MCV RBC AUTO: 93.1 FL (ref 79–97)
MONOCYTES # BLD AUTO: 0.59 10*3/MM3 (ref 0.1–0.9)
MONOCYTES NFR BLD AUTO: 7.2 % (ref 5–12)
NEUTROPHILS NFR BLD AUTO: 4.48 10*3/MM3 (ref 1.7–7)
NEUTROPHILS NFR BLD AUTO: 54.6 % (ref 42.7–76)
NRBC BLD AUTO-RTO: 0 /100 WBC (ref 0–0.2)
PLATELET # BLD AUTO: 190 10*3/MM3 (ref 140–450)
PMV BLD AUTO: 10.9 FL (ref 6–12)
POTASSIUM SERPL-SCNC: 4 MMOL/L (ref 3.5–5.2)
PROT SERPL-MCNC: 6.6 G/DL (ref 6–8.5)
RBC # BLD AUTO: 4.51 10*6/MM3 (ref 4.14–5.8)
SODIUM SERPL-SCNC: 137 MMOL/L (ref 136–145)
TROPONIN T SERPL-MCNC: <0.01 NG/ML (ref 0–0.03)
WBC NRBC COR # BLD: 8.21 10*3/MM3 (ref 3.4–10.8)

## 2022-02-26 PROCEDURE — 93010 ELECTROCARDIOGRAM REPORT: CPT | Performed by: INTERNAL MEDICINE

## 2022-02-26 PROCEDURE — 84484 ASSAY OF TROPONIN QUANT: CPT | Performed by: EMERGENCY MEDICINE

## 2022-02-26 PROCEDURE — 99283 EMERGENCY DEPT VISIT LOW MDM: CPT

## 2022-02-26 PROCEDURE — 93005 ELECTROCARDIOGRAM TRACING: CPT | Performed by: EMERGENCY MEDICINE

## 2022-02-26 PROCEDURE — 71046 X-RAY EXAM CHEST 2 VIEWS: CPT

## 2022-02-26 PROCEDURE — 80053 COMPREHEN METABOLIC PANEL: CPT | Performed by: EMERGENCY MEDICINE

## 2022-02-26 PROCEDURE — 93005 ELECTROCARDIOGRAM TRACING: CPT

## 2022-02-26 PROCEDURE — 99283 EMERGENCY DEPT VISIT LOW MDM: CPT | Performed by: EMERGENCY MEDICINE

## 2022-02-26 PROCEDURE — 85025 COMPLETE CBC W/AUTO DIFF WBC: CPT | Performed by: EMERGENCY MEDICINE

## 2022-03-01 LAB — QT INTERVAL: 401 MS

## 2022-03-11 ENCOUNTER — OFFICE VISIT (OUTPATIENT)
Dept: CARDIOLOGY | Facility: CLINIC | Age: 37
End: 2022-03-11

## 2022-03-11 VITALS
DIASTOLIC BLOOD PRESSURE: 74 MMHG | BODY MASS INDEX: 24.25 KG/M2 | SYSTOLIC BLOOD PRESSURE: 124 MMHG | HEIGHT: 73 IN | WEIGHT: 183 LBS

## 2022-03-11 DIAGNOSIS — I47.9 PAROXYSMAL TACHYCARDIA: ICD-10-CM

## 2022-03-11 DIAGNOSIS — R06.83 SNORING: ICD-10-CM

## 2022-03-11 DIAGNOSIS — I20.8 ANGINA AT REST: ICD-10-CM

## 2022-03-11 DIAGNOSIS — I48.0 PAROXYSMAL ATRIAL FIBRILLATION: Primary | ICD-10-CM

## 2022-03-11 DIAGNOSIS — R40.0 HAS DAYTIME DROWSINESS: ICD-10-CM

## 2022-03-11 PROBLEM — I20.89 ANGINA AT REST: Status: ACTIVE | Noted: 2022-03-11

## 2022-03-11 PROCEDURE — 99214 OFFICE O/P EST MOD 30 MIN: CPT

## 2022-03-11 PROCEDURE — 93000 ELECTROCARDIOGRAM COMPLETE: CPT

## 2022-03-11 RX ORDER — FLECAINIDE ACETATE 50 MG/1
TABLET ORAL
Qty: 30 TABLET | Refills: 2 | Status: SHIPPED | OUTPATIENT
Start: 2022-03-11
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
## 2022-04-22 ENCOUNTER — OFFICE VISIT (OUTPATIENT)
Dept: SLEEP MEDICINE | Facility: HOSPITAL | Age: 37
End: 2022-04-22

## 2022-04-22 VITALS
HEIGHT: 73 IN | BODY MASS INDEX: 23.72 KG/M2 | WEIGHT: 179 LBS | SYSTOLIC BLOOD PRESSURE: 117 MMHG | HEART RATE: 77 BPM | DIASTOLIC BLOOD PRESSURE: 77 MMHG | OXYGEN SATURATION: 98 %

## 2022-04-22 DIAGNOSIS — R06.83 SNORING: ICD-10-CM

## 2022-04-22 DIAGNOSIS — G47.10 HYPERSOMNIA: Primary | ICD-10-CM

## 2022-04-22 DIAGNOSIS — I48.0 PAROXYSMAL ATRIAL FIBRILLATION: ICD-10-CM

## 2022-04-22 PROCEDURE — G0463 HOSPITAL OUTPT CLINIC VISIT: HCPCS

## 2022-04-22 NOTE — PROGRESS NOTES
"[facility]  Telephone: [phone] / Fax: [phone] [place]  Telephone: [phone] / Fax: [phone] Misti Fletcher    Referring Physician: Dr Huizar  PCP: Violet DOW    Reason for consult:  sleep apnea    Jayson Moon is a 36 y.o.male  was seen in the Sleep Disorders Center today for evaluation of sleep apnea. He has been dealing with A-fib for several years now. He was referred for eval by Dr Huizar for JOSE ANGEL. He has been waking up gasping for breath, choking and has been feeling tired/sleepy during the day.  Afib comes and goes.  He is able to revert to NSR with Flecainide. Episodes every 3-4 months. Worse with excessive caffeine, and fatigue.  Snoring is loud and occurs in all positions. Worse during periods of congestion. It wakes his wife up. He sleeps from MN-7:30am.      SH- works as realtor, drinks 1 caffeine per day.    ROS- +nasal congestion, +myalgias, +swollen joints, +irregular heart rate. Rest is negative.    Jayson Moon  has a past medical history of Atrial fibrillation (HCC) and PAF (paroxysmal atrial fibrillation) ([facility]).    Current Medications:    Current Outpatient Medications:   •  flecainide (TAMBOCOR) 50 MG tablet, Take 1 tablet daily as needed., Disp: 30 tablet, Rfl: 2    I have reviewed Past Medical History, Past Surgical History, Medication List, Social History and Family History as entered in Sleep Questionnaire and EPIC.    ESS     Vital Signs /77   Pulse 77   Ht 185.4 cm (73\")   Wt 81.2 kg (179 lb)   SpO2 98%   BMI 23.62 kg/m²  Body mass index is 23.62 kg/m².    General Alert and oriented. No acute distress noted   Pharynx/Throat Class IV  Mallampati airway, large tongue, no evidence of redundant lateral pharyngeal tissue. No oral lesions. No thrush. Moist mucous membranes.   Head Normocephalic. Symmetrical. Atraumatic.    Nose No septal deviation. No drainage   Chest Wall Normal shape. Symmetric expansion with respiration. No " tenderness.   Neck Trachea midline, no thyromegaly or adenopathy    Lungs Clear to auscultation bilaterally. No wheezes. No rhonchi. No rales. Respirations regular, even and unlabored.   Heart Regular rhythm and normal rate. Normal S1 and S2. No murmur   Abdomen Soft, non-tender and non-distended. Normal bowel sounds. No masses.   Extremities Moves all extremities well. No edema   Psychiatric Normal mood and affect.        Impression:  1. Hypersomnia    2. Snoring    3. Paroxysmal atrial fibrillation (HCC)          Plan:  HST was scheduled.    I discussed the pathophysiology of obstructive sleep apnea with the patient.  We discussed the adverse outcomes associated with untreated sleep-disordered breathing.  We discussed treatment modalities of obstructive sleep apnea including CPAP device. Sleep study will be scheduled to establish a definitive diagnosis of sleep disorder breathing.  Weight loss will be strongly beneficial in order to reduce the severity of sleep-disordered breathing.  Patient has narrow oropharyngeal structure.  Caution during activities that require prolonged concentration is strongly advised.  After sleep study results are available, patient will be notified, and appointment will be scheduled to discuss sleep study results and treatment recommendations.      I appreciate the opportunity to participate in this patient's care.      VICTOR M Harding  [facility]  Phone: [phone]      Part of this note may be an electronic transcription/translation of spoken language to printed text using the Dragon Dictation System. Some errors may exist even though the document was edited.

## 2022-05-25 ENCOUNTER — HOSPITAL ENCOUNTER (OUTPATIENT)
Dept: SLEEP MEDICINE | Facility: HOSPITAL | Age: 37
Discharge: HOME | End: 2022-05-25
Admitting: NURSE PRACTITIONER

## 2022-05-25 DIAGNOSIS — G47.10 HYPERSOMNIA: ICD-10-CM

## 2022-05-25 DIAGNOSIS — R06.83 SNORING: ICD-10-CM

## 2022-05-25 PROCEDURE — 95806 SLEEP STUDY UNATT&RESP EFFT: CPT

## 2022-06-17 ENCOUNTER — TELEPHONE (OUTPATIENT)
Dept: SLEEP MEDICINE | Facility: HOSPITAL | Age: 37
End: 2022-06-17

## 2022-06-24 ENCOUNTER — OFFICE VISIT (OUTPATIENT)
Dept: SLEEP MEDICINE | Facility: HOSPITAL | Age: 37
End: 2022-06-24

## 2022-06-24 ENCOUNTER — TELEPHONE (OUTPATIENT)
Dept: SLEEP MEDICINE | Facility: HOSPITAL | Age: 37
End: 2022-06-24

## 2022-06-24 VITALS
WEIGHT: 181 LBS | HEART RATE: 78 BPM | SYSTOLIC BLOOD PRESSURE: 117 MMHG | BODY MASS INDEX: 23.99 KG/M2 | HEIGHT: 73 IN | OXYGEN SATURATION: 97 % | DIASTOLIC BLOOD PRESSURE: 79 MMHG

## 2022-06-24 DIAGNOSIS — I48.91 ATRIAL FIBRILLATION, UNSPECIFIED TYPE: ICD-10-CM

## 2022-06-24 DIAGNOSIS — G47.33 OBSTRUCTIVE SLEEP APNEA: Primary | ICD-10-CM

## 2022-06-24 PROCEDURE — G0463 HOSPITAL OUTPT CLINIC VISIT: HCPCS

## 2022-06-24 NOTE — PROGRESS NOTES
"[facility] SLEEP MEDICINE  [address]  [place] [address] [phone]    PCP: Provider, No Known    Reason for visit:  Sleep disorders: JOSE ANGEL    Jayson is a 37 y.o.male who was seen in the Sleep Disorders Center today. He has snoring and EDS. Sleeps from 11pm to 7am. He wakes up feeling somewhat tired. Has Afib and follows Dr. Huizar.  [place] Sleepiness Scale is 6. Caffeine 1 per day. Alcohol 0.5 per week.    Jayson  reports that he has never smoked. He has never used smokeless tobacco.    Pertinent Positive Review of Systems of denies  Rest of Review of Systems was negative as recorded in Sleep Questionnaire.    Patient  has a past medical history of Atrial fibrillation (HCC) and PAF (paroxysmal atrial fibrillation) (HCC).     Current Medications:    Current Outpatient Medications:   •  flecainide (TAMBOCOR) 50 MG tablet, Take 1 tablet daily as needed., Disp: 30 tablet, Rfl: 2   also entered in Sleep Questionnaire         Vital Signs: /79   Pulse 78   Ht 185.4 cm (73\")   Wt 82.1 kg (181 lb)   SpO2 97%   BMI 23.88 kg/m²     Body mass index is 23.88 kg/m².       Tongue: Normal       Dentition: good       Pharynx: Posterior pharyngeal pillars are narrow   Mallampatti: III (soft and hard palate and base of uvula visible)        General: Alert. Cooperative. Well developed. No acute distress.             Head:  Normocephalic. Symmetrical. Atraumatic.              Nose: No septal deviation. No drainage.          Throat: No oral lesions. No thrush. Moist mucous membranes.    Chest Wall:  Normal shape. Symmetric expansion with respiration. No tenderness.             Neck:  Trachea midline.           Lungs:  Clear to auscultation bilaterally. No wheezes. No rhonchi. No rales. Respirations regular, even and unlabored.            Heart:  Regular rhythm and normal rate. Normal S1 and S2. No murmur.     Abdomen:  Soft, non-tender and non-distended. Normal bowel sounds. No " masses.  Extremities:  Moves all extremities well. No edema.    Psychiatric: Normal mood and affect.    Diagnostic data available to date is as below and was reviewed on current visit:  · 5/26/22   The patient tolerated the home sleep testing with monitoring time of 509 minutes. The data obtained make this a technically adequate study. The apnea hypopneas index(AHI) was 9.9 per sleep hour.  The AHI during supine position was 15.8 per sleep hour. Mean heart rate of 59.1 BPM.  Snoring was noted 82.2% of sleep time. Lowest oxygen saturation during the study was 79%. Saturation below 89% was noted for 2.1 mins.      No orders of the defined types were placed in this encounter.         Impression:  1. Obstructive sleep apnea    2. Atrial fibrillation, unspecified type (HCC)        Plan:  Jayson has moderate sleep disordered breathing in the supine position.  I discussed the results of study with him.  Discussed treatment options.  In view of A. fib treatment is beneficial.  Discussed oral appliance and CPAP device.  He prefers definitive treatment with CPAP and auto CPAP 5-15 cm were therefore ordered.      Jayson has moderate sleep apnea.  I discussed results with him and explained the cardiovascular health associations besides other health problems associated with significant sleep disordered breathing.  We discussed various modalities of treatment and went through the pros and cons of each. Oral mandibular advancing device and possible use of an INSPIRE device was discussed.   In his case I would recommend treatment with a positive airway pressure device.      Positive airway pressure devices are most effective management strategy. Various mask interfaces were discussed.  I explained that the most important factor in compliance is a comfortable mask and the profile of the mask on the face (full face / nasal etc.) eventually makes little difference. Jayson has been fitted for a mask in the sleep center and will trial that  first.  I reminded him that the device mask can be changed within the first month as many times as he likes.  Thereafter it can only be changed every 3 months through insurance.      The pressure on the positive airway pressure machine can feel overwhelming at first.  If this is the case Jayson could use the machine while awake, such as while watching television, in order to get used.  However the face acclimatize to the pressure within a few weeks and thereafter the pressures are far less noticeable.  I explained to him the necessity of breathing through the nose and not through the mouth.      Compliance requirements of insurance were discussed and especially the fact that lack of compliance within the first 90 days of at least 4 hours usage nightly, may result in repossession of the positive airway pressure device by the durable medical equipment company. Jayson  will then have to have a repeat sleep study prior to getting a new device.    I have prescribed a new device to his preferred durable medical equipment company and will see Jayson back for a compliance check.     Patient will follow up in this clinic in 3 months.    Thank you for allowing me to participate in your patient's care.    Electronically signed by Saulo Conner MD, 06/24/22, 9:58 AM EDT.    Part of this note may be an electronic transcription/translation of spoken language to printed text using the Dragon Dictation System.

## 2022-09-26 ENCOUNTER — OFFICE VISIT (OUTPATIENT)
Dept: SLEEP MEDICINE | Facility: HOSPITAL | Age: 37
End: 2022-09-26

## 2022-09-26 VITALS
BODY MASS INDEX: 25.31 KG/M2 | HEIGHT: 73 IN | HEART RATE: 89 BPM | OXYGEN SATURATION: 98 % | WEIGHT: 191 LBS | SYSTOLIC BLOOD PRESSURE: 140 MMHG | DIASTOLIC BLOOD PRESSURE: 72 MMHG

## 2022-09-26 DIAGNOSIS — I48.91 ATRIAL FIBRILLATION, UNSPECIFIED TYPE: ICD-10-CM

## 2022-09-26 DIAGNOSIS — G47.33 OBSTRUCTIVE SLEEP APNEA: Primary | ICD-10-CM

## 2022-09-26 PROCEDURE — G0463 HOSPITAL OUTPT CLINIC VISIT: HCPCS

## 2022-09-26 NOTE — PROGRESS NOTES
"[facility]  Telephone: [phone] / Fax: [phone] [place]  Telephone: [phone] / Fax: [phone] Misti Fletcher    PCP: Provider, No Known    Reason for visit: JOSE ANGEL f/u    Jayson Moon is a 37 y.o.male  was last seen at [facility] sleep lab in June 2022. Gasping for breath episodes disappeared since he started using the CPAP. He started drinking more caffeine and notices  intermittent palpitations.. He has Flecainide prescribed but takes only as needed. He goes to bed at 11:30pm-7:30am. ESS is 4.  He uses FFM. It fits well. He is unaware of leaks or dry mouth.     SH-  He drinks 2-3 caffeine per day(12 oz each)    ROS- negative    DME [employer]    Current Medications:    Current Outpatient Medications:   •  flecainide (TAMBOCOR) 50 MG tablet, Take 1 tablet daily as needed., Disp: 30 tablet, Rfl: 2   also entered in Sleep Questionnaire    Patient  has a past medical history of Atrial fibrillation (HCC) and PAF (paroxysmal atrial fibrillation) ([facility]).    I have reviewed the Past Medical History, Past Surgical History, Social History and Family History.    Vital Signs /72   Pulse 89   Ht 185.4 cm (72.99\")   Wt 86.6 kg (191 lb)   SpO2 98%   BMI 25.20 kg/m²  Body mass index is 25.2 kg/m².    General Alert and oriented. No acute distress noted   Pharynx/Throat Class IV  Mallampati airway, large tongue, no evidence of redundant lateral pharyngeal tissue. No oral lesions. No thrush. Moist mucous membranes.   Head Normocephalic. Symmetrical. Atraumatic.    Nose No septal deviation. No drainage   Chest Wall Normal shape. Symmetric expansion with respiration. No tenderness.   Neck Trachea midline, no thyromegaly or adenopathy    Lungs Clear to auscultation bilaterally. No wheezes. No rhonchi. No rales. Respirations regular, even and unlabored.   Heart Regular rhythm and normal rate. Normal S1 and S2. No murmur   Abdomen Soft, non-tender and non-distended. Normal bowel sounds. No masses. "   Extremities Moves all extremities well. No edema   Psychiatric Normal mood and affect.     Testing:  · Download 6/25/22-9/22/22 62% use with average nightly use of 6 hours and 27 minutes on auto CPAP 5-15cm H2O, AHI 2.5, leak of 32 L/min.    Diagnostic data available to date is as below and was reviewed on current visit:  · 5/26/22   The patient tolerated the home sleep testing with monitoring time of 509 minutes. The data obtained make this a technically adequate study. The apnea hypopneas index(AHI) was 9.9 per sleep hour.  The AHI during supine position was 15.8 per sleep hour. Mean heart rate of 59.1 BPM.  Snoring was noted 82.2% of sleep time. Lowest oxygen saturation during the study was 79%. Saturation below 89% was noted for 2.1 mins.    Impression:  1. Obstructive sleep apnea    2. Atrial fibrillation, unspecified type (HCC)          Plan:  Jayson is doing great on the CPAP machine. He denies snoring or apneas while on the machine. In view of intermittent history of a-fib, treatment is advised and will continue. I reviewed original sleep study and download report with patient. Residual AHI is 2.5 on treatment, which is excellent. He has no problems with mask or pressures.      I asked patient to f/u with Dr. Conner in one year. Renew supplies in regular intervals.    Thank you for allowing me to participate in your patient's care.      VICTOR M Harding  [facility]  Phone: [phone]      Part of this note may be an electronic transcription/translation of spoken language to printed text using the Dragon Dictation System.

## 2023-03-15 ENCOUNTER — OFFICE VISIT (OUTPATIENT)
Dept: CARDIOLOGY | Facility: CLINIC | Age: 38
End: 2023-03-15
Payer: COMMERCIAL

## 2023-03-15 VITALS
SYSTOLIC BLOOD PRESSURE: 130 MMHG | DIASTOLIC BLOOD PRESSURE: 80 MMHG | BODY MASS INDEX: 25.13 KG/M2 | HEART RATE: 91 BPM | WEIGHT: 189.6 LBS | HEIGHT: 73 IN

## 2023-03-15 DIAGNOSIS — I48.0 PAROXYSMAL ATRIAL FIBRILLATION: Primary | ICD-10-CM

## 2023-03-15 PROCEDURE — 99213 OFFICE O/P EST LOW 20 MIN: CPT | Performed by: INTERNAL MEDICINE

## 2023-03-15 PROCEDURE — 93000 ELECTROCARDIOGRAM COMPLETE: CPT | Performed by: INTERNAL MEDICINE

## 2023-03-15 RX ORDER — DIPHENOXYLATE HYDROCHLORIDE AND ATROPINE SULFATE 2.5; .025 MG/1; MG/1
1 TABLET ORAL DAILY
COMMUNITY

## 2023-03-15 NOTE — PROGRESS NOTES
Date of Office Visit: 03/15/2023  Encounter Provider: Dov Huizar MD  Place of Service: [facility] CARDIOLOGY  Patient Name: Jayson Moon  : 1985    Subjective:     Encounter Date:03/15/2023      Patient ID: Jayson Moon is a 37 y.o. male who has a cc of  PAF and occ takes flecainide     The patient had a good year.   No anginal chest pain,   No sig monroy,   No soa,   No fainting,  No orthostasis.   No edema.   Exercise tolerance: good.   There have been no hospital admission since the last visit.     There have been no bleeding events.       Past Medical History:   Diagnosis Date   • Atrial fibrillation (HCC)    • PAF (paroxysmal atrial fibrillation) (HCC)        Social History     Socioeconomic History   • Marital status:    Tobacco Use   • Smoking status: Never   • Smokeless tobacco: Never   Vaping Use   • Vaping Use: Never used   Substance and Sexual Activity   • Alcohol use: No   • Drug use: No   • Sexual activity: Defer       Family History   Problem Relation Age of Onset   • Lupus Mother    • Hypertension Mother    • Coronary artery disease Mother    • Atrial fibrillation Father    • Atrial fibrillation Brother    • Heart disease Brother         Possible flutter   • Colon cancer Neg Hx    • Colon polyps Neg Hx        Review of Systems   Constitutional: Negative for fever and night sweats.   HENT: Negative for ear pain and stridor.    Eyes: Negative for discharge and visual halos.   Cardiovascular: Negative for cyanosis.   Respiratory: Negative for hemoptysis and sputum production.    Hematologic/Lymphatic: Negative for adenopathy.   Skin: Negative for nail changes and unusual hair distribution.   Musculoskeletal: Negative for gout and joint swelling.   Gastrointestinal: Negative for bowel incontinence and flatus.   Genitourinary: Negative for dysuria and flank pain.   Neurological: Negative for seizures and tremors.   Psychiatric/Behavioral: Negative for altered mental  "status. The patient is not nervous/anxious.             Objective:     Vitals:    03/15/23 0919   BP: 130/80   Pulse: 91   Weight: 86 kg (189 lb 9.6 oz)   Height: 185.4 cm (73\")         Eyes:      General:         Right eye: No discharge.         Left eye: No discharge.   HENT:      Head: Normocephalic and atraumatic.   Neck:      Thyroid: No thyromegaly.      Vascular: No JVD.   Pulmonary:      Effort: Pulmonary effort is normal.      Breath sounds: Normal breath sounds. No rales.   Cardiovascular:      Normal rate. Regular rhythm.      No gallop.   Edema:     Peripheral edema absent.   Abdominal:      General: Bowel sounds are normal.      Palpations: Abdomen is soft.      Tenderness: There is no abdominal tenderness.   Musculoskeletal: Normal range of motion.         General: No deformity. Skin:     General: Skin is warm and dry.      Findings: No erythema.   Neurological:      Mental Status: Alert and oriented to person, place, and time.      Motor: Normal muscle tone.   Psychiatric:         Behavior: Behavior normal.         Thought Content: Thought content normal.           ECG 12 Lead    Date/Time: 3/15/2023 9:56 AM  Performed by: Dov Huizar MD  Authorized by: Dov Huizar MD   Rhythm: sinus rhythm  Rate: normal  Conduction: conduction normal  ST Segments: ST segments normal  T Waves: T waves normal  QRS axis: normal    Clinical impression: normal ECG            Lab Review:       Assessment:          Diagnosis Plan   1. Paroxysmal atrial fibrillation (HCC)               Plan:     Exam and ECG are normal     He is doing ok           "

## 2023-09-26 ENCOUNTER — OFFICE VISIT (OUTPATIENT)
Dept: SLEEP MEDICINE | Facility: HOSPITAL | Age: 38
End: 2023-09-26
Payer: COMMERCIAL

## 2023-09-26 VITALS
HEIGHT: 73 IN | HEART RATE: 88 BPM | SYSTOLIC BLOOD PRESSURE: 123 MMHG | BODY MASS INDEX: 24.52 KG/M2 | DIASTOLIC BLOOD PRESSURE: 85 MMHG | OXYGEN SATURATION: 98 % | WEIGHT: 185 LBS

## 2023-09-26 DIAGNOSIS — R09.81 NASAL CONGESTION: ICD-10-CM

## 2023-09-26 DIAGNOSIS — I48.0 PAROXYSMAL ATRIAL FIBRILLATION: ICD-10-CM

## 2023-09-26 DIAGNOSIS — G47.33 OBSTRUCTIVE SLEEP APNEA: Primary | ICD-10-CM

## 2023-09-26 PROCEDURE — G0463 HOSPITAL OUTPT CLINIC VISIT: HCPCS

## 2023-09-26 NOTE — PROGRESS NOTES
"[facility] SLEEP MEDICINE  [address]  [place] [address] [phone]    PCP: Provider, No Known    Reason for visit:  Sleep disorders: JOSE ANGEL    Jayson is a 38 y.o.male who was seen in the Sleep Disorders Center today. Not compliant recently due to nasal congestion. He sleeps from 11:30pm to 7am. He has Afib. Typically sleeps supine. He feels he gets congested every time he uses CPAP.  Fletcher Sleepiness Scale is 6. Caffeine 2 per day. Alcohol 1 per week.    Jayson  reports that he has never smoked. He has never used smokeless tobacco.    Pertinent Positive Review of Systems of denies  Rest of Review of Systems was negative as recorded in Sleep Questionnaire.    Patient  has a past medical history of Atrial fibrillation and PAF (paroxysmal atrial fibrillation).     Current Medications:    Current Outpatient Medications:     flecainide (TAMBOCOR) 50 MG tablet, Take 1 tablet daily as needed., Disp: 30 tablet, Rfl: 2    multivitamin (MULTI-VITAMIN DAILY PO), Take 1 tablet by mouth Daily., Disp: , Rfl:    also entered in Sleep Questionnaire         Vital Signs: /85   Pulse 88   Ht 185.4 cm (72.99\")   Wt 83.9 kg (185 lb)   SpO2 98%   BMI 24.41 kg/m²     Body mass index is 24.41 kg/m².       Tongue: Large       Dentition: good       Pharynx: Posterior pharyngeal pillars are wide   Mallampatti: II (hard and soft palate, upper portion of tonsils anduvula visible)        General: Alert. Cooperative. Well developed. No acute distress.             Head:  Normocephalic. Symmetrical. Atraumatic.              Nose: No septal deviation. No drainage.          Throat: No oral lesions. No thrush. Moist mucous membranes.    Chest Wall:  Normal shape. Symmetric expansion with respiration. No tenderness.             Neck:  Trachea midline.           Lungs:  Clear to auscultation bilaterally. No wheezes. No rhonchi. No rales. Respirations regular, even and unlabored.            Heart:  Regular " rhythm and normal rate. Normal S1 and S2. No murmur.     Abdomen:  Soft, non-tender and non-distended. Normal bowel sounds. No masses.  Extremities:  Moves all extremities well. No edema.    Psychiatric: Normal mood and affect.    Diagnostic data available to date is as below and was reviewed on current visit:  5/26/22   The patient tolerated the home sleep testing with monitoring time of 509 minutes. The data obtained make this a technically adequate study. The apnea hypopneas index(AHI) was 9.9 per sleep hour.  The AHI during supine position was 15.8 per sleep hour. Mean heart rate of 59.1 BPM.  Snoring was noted 82.2% of sleep time. Lowest oxygen saturation during the study was 79%. Saturation below 89% was noted for 2.1 mins.    No results found for: IRON, TIBC, FERRITIN    Most current available usage data reviewed on 09/26/2023:      [employer]: [employer]    Prescription to [employer] for replacement supplies as below:    full face mask      Description Replacement    Nasal PILLOWS      A 7034 Nasal Pillows  every 3 mth    A 7033 Repl Nasal Pillows  2 per mth    Nasal MASK/CUSHION      A 7034 Nasal Mask/Cushion  every 3 mth    A 7032 Repl Nasal Mask/Cushion  2 per mth    Full Face MASK     x A 7030 Full Face Mask  every 3 mth   x A 7031 Repl Face Mask  1 per mth      A 4604 Heated Tubing  every 3 mth    A 7037 Standard Tubing  every 3 mth   x A 7035 Headgear  every 3 mth   x A 7046 Repl Humidifier Chamber  every 6 yrs   x A 7038 Disposable Filters  2 per mth   x A 7039 Non-disposable Filter  every 6 mth   x A 7036 Chin Strap  every 6 mth     Orders Placed This Encounter   Procedures    SCANNED - PULMONARY RESULTS          Impression:  1. Obstructive sleep apnea    2. Nasal congestion    3. Paroxysmal atrial fibrillation        Plan:  Jayson was strongly advised to use CPAP or avoid supine sleep strict and use OMAD.     Gets congested with CPAP, can use Flonase. If does not help, then see ENT as may be chronic sinus  issues.    Afib can be made worse if sleep apnea is not adequately treated. Reiterated and discussed.    I reiterated the importance of effective treatment of obstructive sleep apnea with PAP machine.  Cardiovascular health risks of untreated sleep apnea were again reviewed.  Patient was asked to remain cautious if there is persistent hypersomnolence. The benefit of weight loss in reducing severity of obstructive sleep apnea was discussed.  Patient would benefit from adhering to a strict diet to achieve ideal BMI.     Change of PAP supplies regularly is important for effective use.  Change of cushion on the mask or plugs on nasal pillows along with disposable filters once every month and change of mask frame, tubing, headgear and Velcro straps every 6 months at the minimum was reiterated.    Patient will follow up in this clinic in 3 months  aprn at [facility]. He will also do mask fit there.    Thank you for allowing me to participate in your patient's care.    Electronically signed by Saulo Conner MD, 09/26/23, 10:25 AM EDT.    Part of this note may be an electronic transcription/translation of spoken language to printed text using the Dragon Dictation System.

## 2023-10-02 ENCOUNTER — TELEPHONE (OUTPATIENT)
Dept: SLEEP MEDICINE | Facility: HOSPITAL | Age: 38
End: 2023-10-02
Payer: COMMERCIAL

## 2023-10-02 NOTE — TELEPHONE ENCOUNTER
Patient came in for a mask fit. Fitted patient with a [employer] full face Medium headgear and medium mouth piece. Patient will trial this mask until his next apt. Advised patient to call if any issues. Patient understood.